# Patient Record
Sex: FEMALE | Race: BLACK OR AFRICAN AMERICAN
[De-identification: names, ages, dates, MRNs, and addresses within clinical notes are randomized per-mention and may not be internally consistent; named-entity substitution may affect disease eponyms.]

---

## 2019-06-10 ENCOUNTER — HOSPITAL ENCOUNTER (EMERGENCY)
Dept: HOSPITAL 88 - ER | Age: 39
Discharge: HOME | End: 2019-06-10
Payer: MEDICARE

## 2019-06-10 VITALS — HEIGHT: 67 IN | WEIGHT: 170 LBS | BODY MASS INDEX: 26.68 KG/M2

## 2019-06-10 VITALS — DIASTOLIC BLOOD PRESSURE: 93 MMHG | SYSTOLIC BLOOD PRESSURE: 152 MMHG

## 2019-06-10 DIAGNOSIS — F41.1: Primary | ICD-10-CM

## 2019-06-10 DIAGNOSIS — Z91.128: ICD-10-CM

## 2019-06-10 PROCEDURE — 99283 EMERGENCY DEPT VISIT LOW MDM: CPT

## 2019-06-10 NOTE — XMS REPORT
Continuity of Care Document

                             Created on: 10/04/2016



LIBERTY DAVENPORT

External Reference #: [id number]

: 1980

Sex: Female



Demographics







                          Address                   [address] [place] [address] [place]  [address]

 

                          Home Phone                [phone]

 

                          Preferred Language        Unknown

 

                          Marital Status            Unknown

 

                          Anabaptism Affiliation     Unknown

 

                          Race                      Unknown

 

                          Ethnic Group              Unknown





Author







                          Author                    [facility]              Interface

 

                          Address                   Unknown

 

                          Phone                     Unavailable



                                                    



Problems

                    





                    Problem                            Status                            Onset Date     

                          Classification                            Date Reported       

                          Comments                            Source                    

 

                    Tuberculosis screening                                                        10/05/2016

                            Diagnosis                            10/05/2016          

                                                      [facility]                    



                                                                                
       



Medications

                    





                    Medication                            Details                            Route      

                          Status                            Patient Instructions         

                          Ordering Provider                            Order Date           

                                        Source                    

 

                                        Dexamethasone 1 MG/ML / Neomycin 3.5 MG/ML / Polymyxin B 64024 UNT/ML Ophthalmic

 Suspension                             neomycin-polymyxin-dexameth 3.5 mg/mL-10,000

 unit/mL-0.1% eye drops INT  1GTT OU QID                                           

                    Active                                                                    

                                                      [facility]                    

 

                                        Purified Protein Derivative of Tuberculin 50 UNT/ML Injectable Solution [Tubersol]

                                        Tubersol 5 tub. unit/0.1 mL intradermal injection solution

                                                        Active                       

                                                                                                    

[facility]                    



                                                                                
                        



Allergies, Adverse Reactions, Alerts

                    





                    Substance                            Category                            Reaction   

                          Severity                            Reaction type           

                          Status                            Date Reported                     

                          Comments                            Source                    



                                                                



Immunizations

                    





                    Immunization                            Date Given                            Site  

                          Status                            Last Updated             

                          Comments                            Source                    



                                                                        



Results

                    





                    Order Name                            Results                            Value      

                          Reference Range                            Date                

                          Interpretation                            Comments                       

                                        Source                    



                                                                                



Vital Signs

                    





                    Vital Sign                            Value                            Date         

                          Comments                            Source                    



                                                                        



Encounters

                    





                    Location                            Location Details                            Encounter

 Type                            Encounter Number                            Reason For

 Visit                            Attending Provider                            ADM Date

                            DC Date                            Status                

                                        Source                    

 

                          [place] - [facility] - [id number]_LnnvssriGareth Ovalle, ARIELP: [address] [place] [address], Ph. [phone]                                    [id number]               

                                                Sonali Ovalle                             10/05/2016

                                                                                    [facility]

                    



                                                                                
    



Procedures

                    





                    Procedure                            Code                            Date           

                          Perfomer                            Comments                        

                                        Source

## 2019-06-10 NOTE — XMS REPORT
Clinical Summary

                             Created on: 06/10/2019



Pb Luevano

External Reference #: [id number]

: 1980

Sex: Female



Demographics







                          Address                   [address] [place]  [address]

 

                          Home Phone                [phone]

 

                          Preferred Language        English

 

                          Marital Status            

 

                          Mosque Affiliation     [id number]

 

                          Race                      Black or 

 

                          Ethnic Group              Non-





Author







                          Author                    [facility]

 

                          Organization              [facility]

 

                          Address                   Unknown

 

                          Phone                     Unavailable







Support







                Name            Relationship    Address         Phone

 

                Hitesh Silveira    [employer]            Unknown         [phone]

 

                    Roseann Wood    [employer]                [address] [place]  [address]                     [phone]







Care Team Providers







                    Care Team Member Name    Role                Phone

 

                    Asked, No Pcp       PCP                 Unavailable







Allergies

No Known Allergies



Medications

Not on file



Active Problems





Not on file



Social History







                                        Date



                 Tobacco Use     Types           Packs/Day       Years Used 

 

                                         



                                         Former Smoker    









   



                 Alcohol Use     Drinks/Week     oz/Week         Comments

 

   



                                         No   









 



                           Sex Assigned at Birth     Date Recorded

 

 



                                         Not on file 









                                        Industry



                           Job Start Date            Occupation 

 

                                        Not on file



                           Not on file               Not on file 









                                        Travel End



                           Travel History            Travel Start 

 





                                         No recent travel history available.







Last Filed Vital Signs

Not on file



Plan of Treatment







   



                 Health Maintenance     Due Date        Last Done       Comments

 

   



                           INFLUENZA VACCINE         2019  







Results

Not on fileafter 2018



Insurance







                                        Type



            Payer      Benefit     Subscriber ID     Effective     Phone      Address 



                           Plan /                    Dates   



                                         Group     

 

                                        Medicaid



                 MEDICAID        MEDICAID        xxxxxxxxx       2017-P   



                                         resent   

 

                                        HMO



                 CIGNA           CIGNA OPEN      xxxxxxxxxxx     2017-   



                           ACCESS/NET                Present   



                                         WORK     









     



            Guarantor Name     Account     Relation to     Date of     Phone      Billing Address



                     Type                Patient             Birth  

 

     



            Pb Luevano     Personal/F     Self       1980     [phone]     [address]               (Home)              [place] [address]







Advance Directives





Patient has advance care planning documents on file. For more information, yola azul contact:



Sudheer Ferris



[address] [place] [address]

## 2019-06-10 NOTE — XMS REPORT
Encounter Summary

                             Created on: 10/06/2016



[place], [place]

External Reference #: [id number]

: 1980

Sex: Female



Demographics







                          Address                   [address] [place]  [address]

 

                          Home Phone                [phone]

 

                          Preferred Language        Unknown

 

                          Marital Status            Never 

 

                          Zoroastrianism Affiliation     Unknown

 

                          Race                      Unknown

 

                          Ethnic Group              Unknown





Author







                          Organization              Unknown

 

                          Address                   [address] [place] [address]  [address]



 

                          Phone                     [phone]



                                                      



Reason for Visit

                      





                                        Screening - TB                



                                                                              



Instructions

          





                                        1. Tuberculosis screening

 

                                         PPD (purified protein derivative), skin test - Patient was advised to follow-up

 with [facility] within 48-72 hours.

 

                                         Tubersol 5 tub. unit/0.1 mL intradermal injection solution







Discussion Note: None recorded.



Patient educational handouts: No information available.                         
                                                    



Plan of Care

          





Patient Instructions





                                        Please do not put a band aid on, do not scratch or rub on the site.Patient verbalizes

 understanding and agrees to the plan.











                Reminders                                       Provider

 

                Appointments    None recorded.                   

 

                Lab             PPD (Purified Protein Derivative), Skin Test    10/05/2016      [facility]

 

                Referral        None recorded.                   

 

                Procedures      None recorded.                   

 

                Surgeries       None recorded.                   

 

                Imaging         None recorded.                   



                                                                                
       



Medications

                      





                    Name                      Start Date                                      

 

                                        neomycin-polymyxin-dexameth 3.5 mg/mL-10,000 unit/mL-0.1% eye drops

 INT  1GTT OU QID                       

 

                          Tubersol 5 tub. unit/0.1 mL intradermal injection solution    



                                                                                
                 



Medications Administered

                      





                    Name                      Date                                      

 

                                        Tubersol 5 tub. unit/0.1 mL intradermal injection solution

Take by intradermal route.              2016-10-05T16:03:41



                                                                                
       



Vitals

          



None recorded.                                                                  
           



Lab Results

                      



              None recorded.                                                    
                                               



Allergies

          



None recorded.                                                                  
           



Problems

          



None recorded.                                                                  
           



Procedures

          



None recorded.                                                                  
           



Vaccine List

          



None recorded.                                                                  
           



Social History

          



None recorded.                                                                  
           



Past Encounters

                      





                                        10/05/2016

Tuberculosis Screening

ARIEL MulliganP: [address] [place] [address], Ph. [phone]



                                                                                
       



History of Present Illness

                      





                                                   Screening Request - TB

 

                          Reported By:              Patient

 

                          Screening Request:        BCG No prior BCG vaccination. PPD No past history of postive

 TB skin test (PPD), No previous severe local reaction to TB skin test (PPD). 
OTHER No prior vaccines within last month



                                                                              



Review of Systems

                      





                                                   Screening - TB

 

                          Reported By:              Patient



                                                                              



Physical Exam

                      





                                                   Screening

 

                          Reported By:              Patient

 

                          General Appearance:       General: well-developed, well-nourished, no acute distress

## 2019-06-10 NOTE — XMS REPORT
Patient Summary Document

                             Created on: 06/10/2019



LIBERTY BANGURA

External Reference #: [id number]

: 1980

Sex: Female



Demographics







                          Address                   [address] [place]  [address]

 

                          Home Phone                [phone]

 

                          Preferred Language        Unknown

 

                          Marital Status            Unknown

 

                          Zoroastrian Affiliation     Unknown

 

                          Race                      Unknown

 

                          Ethnic Group              Unknown





Author







                          Author                    [facility]

 

                          [facility]

 

                          Address                   Unknown

 

                          Phone                     Unavailable







Support







                Name            Relationship    Address         Phone

 

                    EVY SAGE    PRS                 [address] [place]  [address]                      [phone]

 

                    EVY SAGE    PRS                 [address] [place]  [address]                      [phone]

 

                    NONE,  OTHER        PRS                 [address] [place]  [address]                      [phone]

 

                    NONE,  OTHER        PRS                 [address] [place]  [id number] [phone]







Care Team Providers







                    Care Team Member Name    Role                Phone

 

                          Unavailable               Unavailable







Payers







             Payer Name    Policy Type    Policy Number    Effective Date    Expiration Date







Problems

This patient has no known problems.



Allergies, Adverse Reactions, Alerts







          Allergy Name    Allergy Type    Status    Severity    Reaction(s)    Onset Date    Inactive 

Date                      Treating Clinician        Comments

 

        No Known Allergies    DA      Active    U               2019 00:00:00                     

 

        No Known Contrast Allergies    DA      Active    U               2007 00:00:00                     

 

        No Known Drug Allergies    DA      Active    U               2007 00:00:00                     

 

        No Known Food Allergies    DA      Active    U               2007 00:00:00                     

 

        No Known Other Allergies    DA      Active    U               2007 00:00:00                     







Medications

This patient has no known medications.



Results







           Test Description    Test Time    Test Comments    Text Results    Atomic Results    Result

 Comments

 

                - CT MAXIFAC W/CONTRAST    2019 17:43:00                      Name: LIBERTY BANGURA       

           [facility]                    : 1980 Age/S: 39  / F    
    [address] [place] [address]         Unit #: [id number]     Loc:               
[place] [address]                 Phys: Kieran Lamar  DO                     
                           Acct: [id number]  Dis Date:               Status: 
REG ER                                  PHONE #: [phone]     Exam Date: 
2019  1713                     FAX #: [phone]      Reason: pain      
                                         EXAMS:                                 
             CPT CODE:      [id number] CT MAXIFAC W/CONTRAST                     
[address]                    MAXILLOFACIAL BONE CT WITH IV CONTRAST AND MULTIPLANAR 
REFORMATS       3/4/2019 AT 1713 HOURS.               CLINICAL HISTORY:Right 
posterior upper/lower teeth pain for 2 weeks.               COMPARISON STUDIES: 
None relevant.       ADMINISTERED CONTRAST: 100 mL of Isovue-300 intravenously. 
     DLP: 187.34 mGy-cm               FINDINGS: Contiguous 2.5 mm axial images 
of the maxillofacial bones       were obtained with IV contrast. The acquired 
data was used to create       multiplanar reformatted images.               A 
focal cavity is identified inferomedially to the right 2nd inferior       molar 
tooth on a background of prior dental crowns.  Additional       multiple dental 
crowns are also seen bilaterally with no periapical       abscesses on the 
right.  A large cavity with a missing crown is       identified involving the 
left inferior 2nd molar tooth with a small       lucent periapical abscess.  No 
acute periosteal reaction or bone       destruction.  No organized soft tissue 
abscess.  The nasopharynx,       oropharynx, glottis and visualized subglottic 
trachea are       unremarkable.  Subcentimeter bilateral cervical lymph nodes 
are seen,       not enlarged by size criteria.  The parapharyngeal and 
       spaces are intact.               No abnormal intracranial 
enhancement along the visualized segments.        Mild mucosal thickening is 
seen along the floor of both maxillary       sinuses. Clear mastoid air cells.  
Mild mucosal thickening of the left       frontal sinus is also noted.          
      IMPRESSION:         1. Multifocal bilateral odontogenic disease with focal
cavities         involving the right and left 2nd inferior molar teeth.  Small 
left 2nd         molar periapical abscess.         2. No gumline, submandibular 
or cervical soft tissue abscess.         3. No pathologic cervical adenopathy.  
      4. Chronic inflammatory sinus disease.                   ______
________________________________________________________________         ____   
     CT imaging performed at this location utilizes radiation dose         
optimization techniques which include one or more of the following:         -
Automated exposure control         -Adjustment of the mA and/or kV according to 
patient size         -Use of iterative reconstruction technique     PAGE  1     
                 Signed Report                    (CONTINUED)   Name: 
LIBERTY BANGURA                  [facility]                    : 
1980 Age/S: 39  / F         [address] [place] [address]         Unit #: 
[id number]     Loc:               [place] [address]                 Phys: 
Lamar,Coye Q  DO                                                 Acct: 
[id number]  Dis Date:               Status: REG ER                            
     PHONE #: [phone]     Exam Date: 2019  1713                     
FAX #: [phone]      Reason: pain                                           
    EXAMS:                                               CPT CODE:      
[id number] CT MAXIFAC W/CONTRAST                      [address]               
<Continued>                    SL:  ER-H                ** Electronically Signed
by M.D. Edwin Rodriguez-Reyes **        **                on 2019 at 1743 
              **                      Reported and signed by: Edwin Rodriguez-
Reyes, M.D.                                   CC: Kieran Lamar DO            
                                                                                
                        Technologist:Michael Martinez, RT(R)            CTDI:      
 DLP:        Trnscb Date/Time: 2019 () [email or web address]                     
 Orig Print D/T: S: 2019 ()       CTDI:          DLP:          PAGE  2
                      Signed Report                                    

 

                BASIC METABOLIC PANEL    2019 16:35:00                      

 

   

 

                SODIUM (test code=NA)    142 mEq/L       134-147          

 

                POTASSIUM (test code=K)    3.5 mEq/L       3.4-5.0          

 

                CHLORIDE (test code=CL)    111 mEq/L       100-108          

 

                CARBON DIOXIDE (test code=CO2)    27 mEq/L        21-33            

 

                ANION GAP (test code=GAP)    8               0-20             

 

                GLUCOSE (test code=GLU)    103 mg/dL                  

 

                BLOOD UREA NITROGEN (test code=BUN)    11 mg/dL        7-18             

 

                GLOMERULAR FILTRATION RATE (test code=GFR)    135.4           105-110         Units of measure=ml/min/1.73

 m2

 

                CREATININE (test code=CREAT)    0.6 mg/dL       0.6-1.3          

 

                CALCIUM (test code=CA)    9.0 mg/dL       8.0-10.5         





CBC W/AUTO [id number] 16:22:00* 





                Test Item       Value           Reference Range    Comments

 

                WHITE BLOOD CELL (test code=WBC)    5.37 x10 3/uL    4.5-11.0         

 

                RED BLOOD CELL (test code=RBC)    4.19 x10 6/uL    3.54-5.02        

 

                HEMOGLOBIN (test code=HGB)    12.0 g/dL       11.0-15.0        

 

                HEMATOCRIT (test code=HCT)    38.3 %          33.0-45.0        

 

                MEAN CELL VOLUME (test code=MCV)    91.4 fL         81.0-99.0        

 

                MEAN CELL HGB (test code=MCH)    28.6 pg         27.0-33.0        

 

                MEAN CELL HGB CONCETRATION (test code=MCHC)    31.3 g/dL       33.0-37.0        

 

                RED CELL DISTRIBUTION WIDTH CV (test code=RDW)    15.1 %          11.5-14.5        

 

                RED CELL DISTRIBUTION WIDTH SD (test code=RDW-SD)    50.3 fL         37.0-54.0        

 

                PLATELET COUNT (test code=PLT)    316 x10 3/uL    150-400          

 

                MEAN PLATELET VOLUME (test code=MPV)    10.2 fL         7.0-9.0          

 

                NEUTROPHIL % (test code=NT%)    46.1 %          56.0-77.0        

 

                IMMATURE GRANULOCYTE % (test code=IG%)    0.0 %           0.0-2.0          

 

                LYMPHOCYTE % (test code=LY%)    43.6 %          14.0-32.0        

 

                MONOCYTE % (test code=MO%)    5.8 %           4.8-9.0          

 

                EOSINOPHIL % (test code=EO%)    3.4 %           0.3-3.7          

 

                BASOPHIL % (test code=BA%)    1.1 %           0.0-2.0          

 

                NUCLEATED RBC % (test code=NRBC%)    0.0 %           0-0              

 

                NEUTROPHIL # (test code=NT#)    2.48 x10 3/uL    2.0-7.6          

 

                IMMATURE GRANULOCYTE # (test code=IG#)    0.00 x10 3/uL    0.00-0.03        

 

                LYMPHOCYTE # (test code=LY#)    2.34 x10 3/uL    1.0-3.8          

 

                MONOCYTE # (test code=MO#)    0.31 x10 3/uL    0.1-0.8          

 

                EOSINOPHIL # (test code=EO#)    0.18 x10 3/uL    0.0-0.2          

 

                BASOPHIL # (test code=BA#)    0.06 x10 3/uL    0.0-0.2          

 

                NUCLEATED RBC # (test code=NRBC#)    0.00 x10 3/uL    0.0-0.1          

 

                MANUAL DIFF REQUIRED (test code=MDIFF)    NO